# Patient Record
Sex: MALE | Race: ASIAN | URBAN - METROPOLITAN AREA
[De-identification: names, ages, dates, MRNs, and addresses within clinical notes are randomized per-mention and may not be internally consistent; named-entity substitution may affect disease eponyms.]

---

## 2021-02-20 ENCOUNTER — OUT OF OFFICE VISIT (OUTPATIENT)
Dept: URBAN - METROPOLITAN AREA MEDICAL CENTER 16 | Facility: MEDICAL CENTER | Age: 86
End: 2021-02-20
Payer: MEDICARE

## 2021-02-20 DIAGNOSIS — N17.8 OTHER ACUTE KIDNEY FAILURE: ICD-10-CM

## 2021-02-20 DIAGNOSIS — D69.6 THROMBOCYTOPENIA: ICD-10-CM

## 2021-02-20 DIAGNOSIS — N19 RENAL FAILURE: ICD-10-CM

## 2021-02-20 DIAGNOSIS — A41.89 OTHER SPECIFIED SEPSIS: ICD-10-CM

## 2021-02-20 DIAGNOSIS — R93.2 ABN FIND-BILIARY TRACT: ICD-10-CM

## 2021-02-20 DIAGNOSIS — K85.80 OTHER ACUTE PANCREATITIS WITHOUT INFECTION OR NECROSIS: ICD-10-CM

## 2021-02-20 DIAGNOSIS — K80.30 CALCULUS OF BILE DUCT WITH CHOLANGITIS, UNSPECIFIED, WITHOUT OBSTRUCTION: ICD-10-CM

## 2021-02-20 PROCEDURE — 99222 1ST HOSP IP/OBS MODERATE 55: CPT | Performed by: INTERNAL MEDICINE

## 2021-02-20 PROCEDURE — 99233 SBSQ HOSP IP/OBS HIGH 50: CPT | Performed by: INTERNAL MEDICINE

## 2021-02-20 PROCEDURE — G8427 DOCREV CUR MEDS BY ELIG CLIN: HCPCS | Performed by: INTERNAL MEDICINE

## 2021-02-22 ENCOUNTER — OUT OF OFFICE VISIT (OUTPATIENT)
Dept: URBAN - METROPOLITAN AREA MEDICAL CENTER 16 | Facility: MEDICAL CENTER | Age: 86
End: 2021-02-22
Payer: MEDICARE

## 2021-02-22 DIAGNOSIS — R76.8 HEPATITIS C ANTIBODY TEST POSITIVE: ICD-10-CM

## 2021-02-22 DIAGNOSIS — K83.09 CHOLANGITIS: ICD-10-CM

## 2021-02-22 DIAGNOSIS — D69.6 THROMBOCYTOPENIA: ICD-10-CM

## 2021-02-22 DIAGNOSIS — R93.2 ABN FIND-BILIARY TRACT: ICD-10-CM

## 2021-02-22 DIAGNOSIS — A41.89 OTHER SPECIFIED SEPSIS: ICD-10-CM

## 2021-02-22 PROCEDURE — 99233 SBSQ HOSP IP/OBS HIGH 50: CPT | Performed by: INTERNAL MEDICINE

## 2021-02-22 PROCEDURE — 99232 SBSQ HOSP IP/OBS MODERATE 35: CPT | Performed by: INTERNAL MEDICINE

## 2021-02-24 ENCOUNTER — OUT OF OFFICE VISIT (OUTPATIENT)
Dept: URBAN - METROPOLITAN AREA MEDICAL CENTER 16 | Facility: MEDICAL CENTER | Age: 86
End: 2021-02-24
Payer: MEDICARE

## 2021-02-24 DIAGNOSIS — K83.09 CHOLANGITIS: ICD-10-CM

## 2021-02-24 DIAGNOSIS — A41.89 OTHER SPECIFIED SEPSIS: ICD-10-CM

## 2021-02-24 DIAGNOSIS — R93.2 ABN FIND-BILIARY TRACT: ICD-10-CM

## 2021-02-24 DIAGNOSIS — D69.6 THROMBOCYTOPENIA: ICD-10-CM

## 2021-02-24 PROCEDURE — 99232 SBSQ HOSP IP/OBS MODERATE 35: CPT | Performed by: INTERNAL MEDICINE

## 2021-02-26 ENCOUNTER — OUT OF OFFICE VISIT (OUTPATIENT)
Dept: URBAN - METROPOLITAN AREA MEDICAL CENTER 16 | Facility: MEDICAL CENTER | Age: 86
End: 2021-02-26
Payer: MEDICARE

## 2021-02-26 DIAGNOSIS — K80.50 BILE DUCT CALCULUS: ICD-10-CM

## 2021-02-26 DIAGNOSIS — Z12.11 COLON CANCER SCREENING: ICD-10-CM

## 2021-02-26 DIAGNOSIS — K83.1 AMPULLARY STENOSIS: ICD-10-CM

## 2021-02-26 PROCEDURE — 99232 SBSQ HOSP IP/OBS MODERATE 35: CPT | Performed by: INTERNAL MEDICINE

## 2021-02-26 PROCEDURE — 996 AG2 NON-BILLABLE: Performed by: INTERNAL MEDICINE

## 2021-02-26 PROCEDURE — 43262 ENDO CHOLANGIOPANCREATOGRAPH: CPT | Performed by: INTERNAL MEDICINE

## 2021-02-26 PROCEDURE — 43264 ERCP REMOVE DUCT CALCULI: CPT | Performed by: INTERNAL MEDICINE

## 2021-03-25 ENCOUNTER — LAB OUTSIDE AN ENCOUNTER (OUTPATIENT)
Dept: URBAN - METROPOLITAN AREA CLINIC 118 | Facility: CLINIC | Age: 86
End: 2021-03-25

## 2021-03-25 ENCOUNTER — OFFICE VISIT (OUTPATIENT)
Dept: URBAN - METROPOLITAN AREA CLINIC 118 | Facility: CLINIC | Age: 86
End: 2021-03-25
Payer: MEDICARE

## 2021-03-25 DIAGNOSIS — K83.1 BILIARY STRICTURE: ICD-10-CM

## 2021-03-25 DIAGNOSIS — K80.50 CHOLEDOCHOLITHIASIS: ICD-10-CM

## 2021-03-25 DIAGNOSIS — K76.9 LIVER LESION, LEFT LOBE: ICD-10-CM

## 2021-03-25 PROCEDURE — 99213 OFFICE O/P EST LOW 20 MIN: CPT | Performed by: INTERNAL MEDICINE

## 2021-03-25 RX ORDER — INSULIN DETEMIR 100 [IU]/ML
AS DIRECTED INJECTION, SOLUTION SUBCUTANEOUS
Status: ACTIVE | COMMUNITY

## 2021-03-25 RX ORDER — INSULIN ASPART 100 [IU]/ML
AS DIRECTED INJECTION, SOLUTION INTRAVENOUS; SUBCUTANEOUS
Status: ACTIVE | COMMUNITY

## 2021-03-25 RX ORDER — METFORMIN HYDROCHLORIDE 500 MG/1
1 TABLET WITH A MEAL TABLET, FILM COATED ORAL ONCE A DAY
Status: ACTIVE | COMMUNITY

## 2021-03-25 RX ORDER — GLIPIZIDE 5 MG/1
1 TABLET 30 MINUTES BEFORE BREAKFAST TABLET ORAL ONCE A DAY
Status: ACTIVE | COMMUNITY

## 2021-03-25 NOTE — HPI-TODAY'S VISIT:
This is an 84 yo male with pmh of DM, HTN, and CCY here for hospital follow up visit. He was admitted in 02/2021 at [facility] for cholangitis/sepsis with CBD stones. Underwent ERCP with Dr. Sellers showing 2 cm biliary stricture s/p sphincterotomy/stone removal and biliary brushing. Brushing was negative for malignancy. He was discharged to rehab for 3 weeks and has been home for the past 1 week.  He is here with his daugther who gives history. No abdominal pain, nausea/vomiting or weight loss.

## 2021-03-26 LAB
A/G RATIO: 1.5
ALBUMIN: 4
ALKALINE PHOSPHATASE: 197
ALT (SGPT): 18
AST (SGOT): 28
BILIRUBIN, TOTAL: 1.1
BUN/CREATININE RATIO: 13
BUN: 17
CALCIUM: 9.1
CARBON DIOXIDE, TOTAL: 23
CHLORIDE: 102
CREATININE: 1.36
EGFR IF AFRICN AM: 54
EGFR IF NONAFRICN AM: 47
GLOBULIN, TOTAL: 2.6
GLUCOSE: 96
POTASSIUM: 4.3
PROTEIN, TOTAL: 6.6
SODIUM: 139

## 2021-06-28 ENCOUNTER — OFFICE VISIT (OUTPATIENT)
Dept: URBAN - METROPOLITAN AREA CLINIC 118 | Facility: CLINIC | Age: 86
End: 2021-06-28
Payer: MEDICARE

## 2021-06-28 ENCOUNTER — LAB OUTSIDE AN ENCOUNTER (OUTPATIENT)
Dept: URBAN - METROPOLITAN AREA CLINIC 118 | Facility: CLINIC | Age: 86
End: 2021-06-28

## 2021-06-28 ENCOUNTER — DASHBOARD ENCOUNTERS (OUTPATIENT)
Age: 86
End: 2021-06-28

## 2021-06-28 DIAGNOSIS — R59.1 LYMPHADENOPATHY: ICD-10-CM

## 2021-06-28 DIAGNOSIS — K83.1 BILIARY STRICTURE: ICD-10-CM

## 2021-06-28 DIAGNOSIS — R74.8 ELEVATED LIVER ENZYMES: ICD-10-CM

## 2021-06-28 DIAGNOSIS — K76.9 LIVER LESION, LEFT LOBE: ICD-10-CM

## 2021-06-28 DIAGNOSIS — K80.50 CHOLEDOCHOLITHIASIS: ICD-10-CM

## 2021-06-28 DIAGNOSIS — K86.2 PANCREATIC CYST: ICD-10-CM

## 2021-06-28 PROCEDURE — 99214 OFFICE O/P EST MOD 30 MIN: CPT | Performed by: INTERNAL MEDICINE

## 2021-06-28 RX ORDER — INSULIN ASPART 100 [IU]/ML
AS DIRECTED INJECTION, SOLUTION INTRAVENOUS; SUBCUTANEOUS
Status: ACTIVE | COMMUNITY

## 2021-06-28 RX ORDER — GLIPIZIDE 5 MG/1
1 TABLET 30 MINUTES BEFORE BREAKFAST TABLET ORAL ONCE A DAY
Status: ACTIVE | COMMUNITY

## 2021-06-28 RX ORDER — INSULIN DETEMIR 100 [IU]/ML
AS DIRECTED INJECTION, SOLUTION SUBCUTANEOUS
Status: ACTIVE | COMMUNITY

## 2021-06-28 RX ORDER — METFORMIN HYDROCHLORIDE 500 MG/1
1 TABLET WITH A MEAL TABLET, FILM COATED ORAL ONCE A DAY
Status: ACTIVE | COMMUNITY

## 2021-06-28 NOTE — HPI-TODAY'S VISIT:
This is an 87 yo male with pmh of DM, HTN, and CCY here for a follow up visit.  Since his last visit, he had MRI done, which the report was not sent to us initially. He has been doing well without abdominal pain or weight loss.   Prior hx:  He was admitted in 02/2021 at [facility] for cholangitis/sepsis with CBD stones. Underwent ERCP with Dr. Sellers showing 2 cm biliary stricture s/p sphincterotomy/stone removal and biliary brushing. Brushing was negative for malignancy.

## 2021-06-29 PROBLEM — 300331000: Status: ACTIVE | Noted: 2021-03-25

## 2021-06-29 PROBLEM — 235921003: Status: ACTIVE | Noted: 2021-03-25

## 2021-06-29 LAB
A/G RATIO: 1.8
ALBUMIN: 4.5
ALKALINE PHOSPHATASE: 116
ALT (SGPT): 16
AST (SGOT): 21
BILIRUBIN, TOTAL: 0.8
BUN/CREATININE RATIO: 14
BUN: 17
CALCIUM: 8.8
CARBON DIOXIDE, TOTAL: 24
CHLORIDE: 104
CREATININE: 1.25
EGFR IF AFRICN AM: 60
EGFR IF NONAFRICN AM: 52
GLOBULIN, TOTAL: 2.5
GLUCOSE: 96
POTASSIUM: 4
PROTEIN, TOTAL: 7
SODIUM: 140

## 2021-09-27 ENCOUNTER — OFFICE VISIT (OUTPATIENT)
Dept: URBAN - METROPOLITAN AREA CLINIC 118 | Facility: CLINIC | Age: 86
End: 2021-09-27